# Patient Record
(demographics unavailable — no encounter records)

---

## 2024-10-30 NOTE — ASSESSMENT
[FreeTextEntry1] : A 31-year-old female with a PMH of chronic sinusitis, pes planus, and posterior vitreous detachment who presented to the office for an initial visit. The patient reported to being diagnosed with posterior vitreous detachment (PVD) by a retina specialist. They were unsure of the cause but apparently advised her to be evaluated for an autoimmune etiology. She was seen by neurologist, Dr. Hernandez, at [facility] and underwent extensive laboratory studies which did not reveal evidence of an autoimmune disease. She was also seen by an allergist and was diagnosed with chronic sinusitis. She also mentioned occasionally noticing swelling around her tattoo sites and was told that it may be an immune reaction. She was advised by her doctors to see a rheumatologist for further evaluation of an autoimmune disease.   The patient currently does not have any obvious clinical evidence of a differentiated connective tissue disease. The etiology of the patient's visual diagnoses is unclear at this time. Her autoimmune testing was notable for negative ABDI, SS-A, SS-B, RF, CCP, and ANCA antibodies. Of note, the [employer] Academy of Ophthalmology's guidelines on PVD do not list autoimmune connective tissue diseases as a risk factor for PVD.  Plan: The patient was advised to monitor for the development of signs or symptoms of a systemic autoimmune rheumatic disease Close follow-up with ophthalmology Follow-up with primary care for preventative care  Return to office as needed.  Reference: Ale TEE, More RA, Jaclyn ST, et al. Posterior Vitreous Detachment, Retinal Breaks, and Lattice Degeneration Preferred Practice Pattern [published correction appears in Ophthalmology. 2020 Sep;127(9):1279. doi: 10.1016/j.[email or web address].2020.06.049]. Ophthalmology. 2020;127(1):J932-T142. doi:10.1016/[email or web address].[email or web address]

## 2024-10-30 NOTE — PHYSICAL EXAM
[General Appearance - Alert] : alert [General Appearance - In No Acute Distress] : in no acute distress [Extraocular Movements] : extraocular movements were intact [Abnormal Walk] : normal gait

## 2024-10-30 NOTE — HISTORY OF PRESENT ILLNESS
[FreeTextEntry1] : The patient is a 31-year-old female with a PMH of chronic sinusitis, pes planus, and posterior vitreous detachment who presented to the office for an initial visit.    The patient reported to being diagnosed with posterior vitreous detachment by a retina specialist. They were unsure of the cause but apparently advised her to be evaluated for an autoimmune etiology. She was seen by neurologist, Dr. Hernandez, at [facility] and underwent extensive laboratory studies which did not reveal evidence of an autoimmune disease. She was also seen by an allergist and was diagnosed with chronic sinusitis. She also mentioned occasionally noticing swelling around her tattoo sites and was told that it may be an immune reaction. She was advised by her doctors to see a rheumatologist for further evaluation of an autoimmune disease.   The patient denied having patchy hair loss, red eyes, eye pain, photosensitivity rash, oral/nasal ulcers, dry mouth, history of blood clots, recurrent miscarriages. She reported having dry eyes and noticing some whitish discoloration of her fingertips from cold exposure. She also reported experiencing numbness in her hands occasionally as well as achiness when performing certain activities.   Family History: Celiac disease in her children Social History: denied cigarette smoking, alcohol use, illicit substance use

## 2024-10-30 NOTE — DATA REVIEWED
[FreeTextEntry1] : 8/9/23  CTA chest: normal   2/26/18  MRI brain: normal  MRI cervical spine: Stable mild C4-5 and C5-6 foraminal narrowing   12/12/2017  TTG IgA, Gliadin IgA/IgG, Endomysial IgA   10/13/2017  Negative ABDI, SS-A, SS-B, RF, Lyme, Syphilis screen